# Patient Record
Sex: FEMALE | Race: ASIAN | NOT HISPANIC OR LATINO | Employment: OTHER
[De-identification: names, ages, dates, MRNs, and addresses within clinical notes are randomized per-mention and may not be internally consistent; named-entity substitution may affect disease eponyms.]

---

## 2018-06-19 ENCOUNTER — HEALTH MAINTENANCE LETTER (OUTPATIENT)
Age: 33
End: 2018-06-19

## 2018-07-25 ENCOUNTER — OFFICE VISIT (OUTPATIENT)
Dept: FAMILY MEDICINE | Facility: CLINIC | Age: 33
End: 2018-07-25

## 2018-07-25 VITALS
SYSTOLIC BLOOD PRESSURE: 106 MMHG | OXYGEN SATURATION: 98 % | BODY MASS INDEX: 23.99 KG/M2 | WEIGHT: 144 LBS | DIASTOLIC BLOOD PRESSURE: 68 MMHG | RESPIRATION RATE: 16 BRPM | HEIGHT: 65 IN | HEART RATE: 79 BPM

## 2018-07-25 DIAGNOSIS — Z12.4 SCREENING FOR MALIGNANT NEOPLASM OF CERVIX: ICD-10-CM

## 2018-07-25 DIAGNOSIS — R07.9 CHEST PAIN, UNSPECIFIED TYPE: ICD-10-CM

## 2018-07-25 DIAGNOSIS — Z00.00 ROUTINE GENERAL MEDICAL EXAMINATION AT A HEALTH CARE FACILITY: Primary | ICD-10-CM

## 2018-07-25 PROCEDURE — 99214 OFFICE O/P EST MOD 30 MIN: CPT | Mod: 25 | Performed by: NURSE PRACTITIONER

## 2018-07-25 PROCEDURE — 99385 PREV VISIT NEW AGE 18-39: CPT | Performed by: NURSE PRACTITIONER

## 2018-07-25 PROCEDURE — 93000 ELECTROCARDIOGRAM COMPLETE: CPT | Performed by: NURSE PRACTITIONER

## 2018-07-25 NOTE — LETTER
[place] [id number] [place]  [address]  Phone: [phone]    July 31, 2018      Erlinda Farooqmikhail  [address]  [facility] [address]              Dear Erlinda,    The results from your recent visit showed Pap was normal.        Sincerely,     Suha Sheppard CNP    Results for orders placed or performed in visit on 07/25/18   Pap IG HPV hr and lr ([facility])   Result Value Ref Range    DIAGNOSIS: Comment     Specimen adequacy: Comment     Clinician Provided ICD10 Comment     Performed by: Comment     QC reviewed by: Comment     . .     Note: Comment     Test Methodology: Comment     HPV High Risk Negative Negative    HPV, low-risk Negative Negative    Narrative    Performed at:  01 - [facility]  [address], [place]  [id number]  : Paula Palafox MD, Phone:  [phone]  Performed at:  02 - [facility]  [address], [place]  [id number]  : Paula Palafox MD, Phone:  [phone]  Specimen Comment: Source.............Cervix  Specimen Comment: LMP / Prev Treat...None  Specimen Comment: Dates / Results....LMP - LAST MONTH  Specimen Comment: No. of containers..01 ThinPrep Vial

## 2018-07-25 NOTE — PROGRESS NOTES
SUBJECTIVE:   CC: Erlinda Cristobal is an 33 year old woman who presents for preventive health visit. No daily meds.     Concerns of some chest pains on/off for pass 2-3 months, difficult to describe, not sharp, no sharp pains in upper body or jaw, no radiation, tender to touch, lifts 2 year old, no SOB, cold clammy sweats or perspiration or waking up at night with difficulty breathing. Pain is not effected by eating, no heartburn or reflux. Worried because has had it so long. No family history of heart issues.     Last Pap unsure    Healthy Habits:    Do you get at least three servings of calcium containing foods daily (dairy, green leafy vegetables, etc.)? yes    Amount of exercise or daily activities, outside of work: 3 day(s) per week    Problems taking medications regularly No    Medication side effects: No    Have you had an eye exam in the past two years? no    Do you see a dentist twice per year? yes    Do you have sleep apnea, excessive snoring or daytime drowsiness?no        Today's PHQ-2 Score:   PHQ-2 ( 1999 [employer]) 7/25/2018   Q1: Little interest or pleasure in doing things 0   Q2: Feeling down, depressed or hopeless 0   PHQ-2 Score 0       Abuse: Current or Past(Physical, Sexual or Emotional)- No  Do you feel safe in your environment - Yes    Social History   Substance Use Topics     Smoking status: Never Smoker     Smokeless tobacco: Never Used     Alcohol use No     If you drink alcohol do you typically have >3 drinks per day or >7 drinks per week? No                     Reviewed orders with patient.  Reviewed health maintenance and updated orders accordingly - Yes      Mammogram not appropriate for this patient based on age.    Pertinent mammograms are reviewed under the imaging tab.  History of abnormal Pap smear: NO - age 30- 65 PAP every 3 years recommended     Reviewed and updated as needed this visit by clinical staff  Tobacco  Allergies  Soc Hx        Reviewed and updated as needed this  "visit by Provider            ROS:  CONSTITUTIONAL: NEGATIVE for fever, chills, change in weight  INTEGUMENTARU/SKIN: NEGATIVE for worrisome rashes, moles or lesions  EYES: NEGATIVE for vision changes or irritation  ENT: NEGATIVE for ear, mouth and throat problems  RESP: NEGATIVE for significant cough or SOB  BREAST: NEGATIVE for masses, tenderness or discharge  CV: NEGATIVE for chest pain, palpitations or peripheral edema  GI: NEGATIVE for nausea, abdominal pain, heartburn, or change in bowel habits  : NEGATIVE for unusual urinary or vaginal symptoms. Periods are regular.  MUSCULOSKELETAL: NEGATIVE for significant arthralgias or myalgia  NEURO: NEGATIVE for weakness, dizziness or paresthesias  PSYCHIATRIC: NEGATIVE for changes in mood or affect    OBJECTIVE:   /68  Pulse 79  Resp 16  Ht 1.651 m (5' 5\")  Wt 65.3 kg (144 lb)  LMP 07/10/2018  SpO2 98%  BMI 23.96 kg/m2  EXAM:  GENERAL: healthy, alert and no distress  EYES: Eyes grossly normal to inspection, PERRL and conjunctivae and sclerae normal  HENT: ear canals and TM's normal, nose and mouth without ulcers or lesions  NECK: no adenopathy, no asymmetry, masses, or scars and thyroid normal to palpation  RESP: lungs clear to auscultation - no rales, rhonchi or wheezes  BREAST: normal without masses, tenderness or nipple discharge and no palpable axillary masses or adenopathy  CV: regular rate and rhythm, normal S1 S2, no S3 or S4, no murmur, click or rub, no peripheral edema and peripheral pulses strong  ABDOMEN: soft, nontender, no hepatosplenomegaly, no masses and bowel sounds normal  MS: no gross musculoskeletal defects noted, no edema :some tenderness to palpation over small area on left side of chest near midline. No lumps.  SKIN: no suspicious lesions or rashes  NEURO: Normal strength and tone, mentation intact and speech normal  PSYCH: mentation appears normal, affect normal/bright    Diagnostic Test Results:  EKG - negative    ASSESSMENT/PLAN: " "  1. Routine general medical examination at a health care facility  Healthy female    2. Screening for malignant neoplasm of cervix  - Pap IG HPV hr and lr ([facility])    3. Chest pain  Assume it is MS, apply warm compress TID and Ibu PRN X 14 DAYS  - EKG 12-lead complete w/read - Clinics    COUNSELING:   Reviewed preventive health counseling, as reflected in patient instructions       Regular exercise       Healthy diet/nutrition    BP Readings from Last 1 Encounters:   07/25/18 106/68     Estimated body mass index is 23.96 kg/(m^2) as calculated from the following:    Height as of this encounter: 1.651 m (5' 5\").    Weight as of this encounter: 65.3 kg (144 lb).           reports that she has never smoked. She has never used smokeless tobacco.      Counseling Resources:  ATP IV Guidelines  Pooled Cohorts Equation Calculator  Breast Cancer Risk Calculator  FRAX Risk Assessment  ICSI Preventive Guidelines  Dietary Guidelines for Americans, 2010  USDA's MyPlate  ASA Prophylaxis  Lung CA Screening    GENIE Lockhart [facility]  "

## 2018-07-25 NOTE — MR AVS SNAPSHOT
After Visit Summary   7/25/2018    Erlinda Cristobal    MRN: [id number]           Patient Information     Date Of Birth          1985        Visit Information        Provider Department      7/25/2018 9:00 AM Suha Sheppard APRN [facility]        Today's Diagnoses     Routine general medical examination at a health care facility    -  1    Screening for malignant neoplasm of cervix        Chest pain, unspecified type          Care Instructions      Preventive Health Recommendations  Female Ages 26 - 39  Yearly exam:   See your health care provider every year in order to    Review health changes.     Discuss preventive care.      Review your medicines if you your doctor has prescribed any.    Until age 30: Get a Pap test every three years (more often if you have had an abnormal result).    After age 30: Talk to your doctor about whether you should have a Pap test every 3 years or have a Pap test with HPV screening every 5 years.   You do not need a Pap test if your uterus was removed (hysterectomy) and you have not had cancer.  You should be tested each year for STDs (sexually transmitted diseases), if you're at risk.   Talk to your provider about how often to have your cholesterol checked.  If you are at risk for diabetes, you should have a diabetes test (fasting glucose).  Shots: Get a flu shot each year. Get a tetanus shot every 10 years.   Nutrition:     Eat at least 5 servings of fruits and vegetables each day.    Eat whole-grain bread, whole-wheat pasta and brown rice instead of white grains and rice.    Get adequate Calcium and Vitamin D.     Lifestyle    Exercise at least 150 minutes a week (30 minutes a day, 5 days of the week). This will help you control your weight and prevent disease.    Limit alcohol to one drink per day.    No smoking.     Wear sunscreen to prevent skin cancer.    See your dentist every six months for an exam and cleaning.            Follow-ups  "after your visit        Follow-up notes from your care team     Return if symptoms worsen or fail to improve.      Who to contact     If you have questions or need follow up information about today's clinic visit or your schedule please contact [facility] directly at [phone].  Normal or non-critical lab and imaging results will be communicated to you by [employer], letter or phone within 4 business days after the clinic has received the results. If you do not hear from us within 7 days, please contact the clinic through Sobrrhart or phone. If you have a critical or abnormal lab result, we will notify you by phone as soon as possible.  Submit refill requests through [employer] or call your pharmacy and they will forward the refill request to us. Please allow 3 business days for your refill to be completed.          Additional Information About Your Visit        Sobrrhart Information     [employer] lets you send messages to your doctor, view your test results, renew your prescriptions, schedule appointments and more. To sign up, go to [email or web address] . Click on \"Log in\" on the left side of the screen, which will take you to the Welcome page. Then click on \"Sign up Now\" on the right side of the page.     You will be asked to enter the access code listed below, as well as some personal information. Please follow the directions to create your username and password.     Your access code is: [email or web address]  Expires: 10/23/2018 12:59 PM     Your access code will  in 90 days. If you need help or a new code, please call your [facility] clinic or [phone].        Care EveryWhere ID     This is your Care EveryWhere ID. This could be used by other organizations to access your [facility] medical records  [id number]        Your Vitals Were     Pulse Respirations Height Last Period Pulse Oximetry BMI (Body Mass Index)    79 16 1.651 m (5' 5\") 07/10/2018 98% 23.96 kg/m2       Blood Pressure from Last 3 Encounters: "   18 106/68   16 119/74    Weight from Last 3 Encounters:   18 65.3 kg (144 lb)   06/15/16 64.4 kg (142 lb)              We Performed the Following     EKG 12-lead complete w/read - Clinics     Pap IG HPV hr and lr ([facility])        Primary Care Provider Office Phone # Fax #    Suha Sheppard, APRN [phone] [address]  [facility] [address]        Equal Access to Services     [facility]: Hadii aad ku hadasho Soomaali, waaxda luqadaha, qaybta kaalmada adeegyada, waxay idiin hayaan penelope perez . So [facility] [phone].    ATENCIÓN: Si habla español, tiene a brantley disposición servicios gratuitos de asistencia lingüística. Llame al [phone].    We comply with applicable federal civil rights laws and [place] laws. We do not discriminate on the basis of race, color, national origin, age, disability, sex, sexual orientation, or gender identity.            Thank you!     Thank you for choosing [facility]  for your care. Our goal is always to provide you with excellent care. Hearing back from our patients is one way we can continue to improve our services. Please take a few minutes to complete the written survey that you may receive in the mail after your visit with us. Thank you!             Your Updated Medication List - Protect others around you: Learn how to safely use, store and throw away your medicines at www.disposemymeds.org.          This list is accurate as of 18 12:59 PM.  Always use your most recent med list.                   Brand Name Dispense Instructions for use Diagnosis    ibuprofen 400 MG tablet    ADVIL/MOTRIN    120 tablet    Take 1-2 tablets (400-800 mg) by mouth every 6 hours as needed for other (cramping)     (spontaneous vaginal delivery)

## 2018-07-30 LAB
.: NORMAL
CLINICIAN PROVIDED ICD10: NORMAL
DIAGNOSIS:: NORMAL
HPV HIGH RISK: NEGATIVE
HPV, LOW RISK: NEGATIVE
Lab: NORMAL
Lab: NORMAL
PERFORMED BY:: NORMAL
SPECIMEN ADEQUACY:: NORMAL
TEST METHODOLOGY:: NORMAL

## 2019-08-28 LAB
C TRACH DNA SPEC QL PROBE+SIG AMP: NEGATIVE
HBV SURFACE AG SERPL QL IA: NEGATIVE
HIV 1+2 AB+HIV1 P24 AG SERPL QL IA: NEGATIVE
RUBELLA ABY IGG: NORMAL

## 2020-02-12 LAB — GROUP B STREP PCR: POSITIVE

## 2020-03-07 ENCOUNTER — MEDICAL CORRESPONDENCE (OUTPATIENT)
Dept: HEALTH INFORMATION MANAGEMENT | Facility: CLINIC | Age: 35
End: 2020-03-07

## 2020-03-07 ENCOUNTER — ANESTHESIA (OUTPATIENT)
Dept: OBGYN | Facility: CLINIC | Age: 35
End: 2020-03-07
Payer: COMMERCIAL

## 2020-03-07 ENCOUNTER — HOSPITAL ENCOUNTER (INPATIENT)
Facility: CLINIC | Age: 35
LOS: 1 days | Discharge: HOME HEALTH | End: 2020-03-08
Attending: OBSTETRICS & GYNECOLOGY | Admitting: OBSTETRICS & GYNECOLOGY
Payer: COMMERCIAL

## 2020-03-07 ENCOUNTER — ANESTHESIA EVENT (OUTPATIENT)
Dept: OBGYN | Facility: CLINIC | Age: 35
End: 2020-03-07
Payer: COMMERCIAL

## 2020-03-07 PROBLEM — Z36.89 ENCOUNTER FOR TRIAGE IN PREGNANT PATIENT: Status: ACTIVE | Noted: 2020-03-07

## 2020-03-07 PROBLEM — Z37.9 VACUUM-ASSISTED VAGINAL DELIVERY: Status: ACTIVE | Noted: 2020-03-07

## 2020-03-07 LAB
ABO + RH BLD: NORMAL
ABO + RH BLD: NORMAL
BLD GP AB SCN SERPL QL: NORMAL
BLOOD BANK CMNT PATIENT-IMP: NORMAL
HGB BLD-MCNC: 11.1 G/DL (ref 11.7–15.7)
RUPTURE OF FETAL MEMBRANES BY ROM PLUS: POSITIVE
SPECIMEN EXP DATE BLD: NORMAL
T PALLIDUM AB SER QL: NONREACTIVE

## 2020-03-07 PROCEDURE — 72200001 ZZH LABOR CARE VAGINAL DELIVERY SINGLE

## 2020-03-07 PROCEDURE — 25000132 ZZH RX MED GY IP 250 OP 250 PS 637: Performed by: OBSTETRICS & GYNECOLOGY

## 2020-03-07 PROCEDURE — 25000125 ZZHC RX 250: Performed by: OBSTETRICS & GYNECOLOGY

## 2020-03-07 PROCEDURE — 86901 BLOOD TYPING SEROLOGIC RH(D): CPT | Performed by: OBSTETRICS & GYNECOLOGY

## 2020-03-07 PROCEDURE — 3E033VJ INTRODUCTION OF OTHER HORMONE INTO PERIPHERAL VEIN, PERCUTANEOUS APPROACH: ICD-10-PCS | Performed by: OBSTETRICS & GYNECOLOGY

## 2020-03-07 PROCEDURE — 25000128 H RX IP 250 OP 636: Performed by: ANESTHESIOLOGY

## 2020-03-07 PROCEDURE — 84112 EVAL AMNIOTIC FLUID PROTEIN: CPT | Performed by: OBSTETRICS & GYNECOLOGY

## 2020-03-07 PROCEDURE — 85018 HEMOGLOBIN: CPT | Performed by: OBSTETRICS & GYNECOLOGY

## 2020-03-07 PROCEDURE — 86780 TREPONEMA PALLIDUM: CPT | Performed by: OBSTETRICS & GYNECOLOGY

## 2020-03-07 PROCEDURE — 40000671 ZZH STATISTIC ANESTHESIA CASE

## 2020-03-07 PROCEDURE — 12000000 ZZH R&B MED SURG/OB

## 2020-03-07 PROCEDURE — 86900 BLOOD TYPING SEROLOGIC ABO: CPT | Performed by: OBSTETRICS & GYNECOLOGY

## 2020-03-07 PROCEDURE — 36415 COLL VENOUS BLD VENIPUNCTURE: CPT | Performed by: OBSTETRICS & GYNECOLOGY

## 2020-03-07 PROCEDURE — 25000128 H RX IP 250 OP 636: Performed by: OBSTETRICS & GYNECOLOGY

## 2020-03-07 PROCEDURE — 0KQM0ZZ REPAIR PERINEUM MUSCLE, OPEN APPROACH: ICD-10-PCS | Performed by: OBSTETRICS & GYNECOLOGY

## 2020-03-07 PROCEDURE — 3E0R3BZ INTRODUCTION OF ANESTHETIC AGENT INTO SPINAL CANAL, PERCUTANEOUS APPROACH: ICD-10-PCS | Performed by: ANESTHESIOLOGY

## 2020-03-07 PROCEDURE — 86850 RBC ANTIBODY SCREEN: CPT | Performed by: OBSTETRICS & GYNECOLOGY

## 2020-03-07 PROCEDURE — 00HU33Z INSERTION OF INFUSION DEVICE INTO SPINAL CANAL, PERCUTANEOUS APPROACH: ICD-10-PCS | Performed by: ANESTHESIOLOGY

## 2020-03-07 PROCEDURE — 25800030 ZZH RX IP 258 OP 636: Performed by: OBSTETRICS & GYNECOLOGY

## 2020-03-07 PROCEDURE — 37000011 ZZH ANESTHESIA WARD SERVICE

## 2020-03-07 PROCEDURE — G0463 HOSPITAL OUTPT CLINIC VISIT: HCPCS

## 2020-03-07 RX ORDER — NALBUPHINE HYDROCHLORIDE 20 MG/ML
2.5-5 INJECTION, SOLUTION INTRAMUSCULAR; INTRAVENOUS; SUBCUTANEOUS EVERY 6 HOURS PRN
Status: DISCONTINUED | OUTPATIENT
Start: 2020-03-07 | End: 2020-03-07

## 2020-03-07 RX ORDER — ONDANSETRON 2 MG/ML
4 INJECTION INTRAMUSCULAR; INTRAVENOUS EVERY 6 HOURS PRN
Status: DISCONTINUED | OUTPATIENT
Start: 2020-03-07 | End: 2020-03-07

## 2020-03-07 RX ORDER — OXYTOCIN 10 [USP'U]/ML
10 INJECTION, SOLUTION INTRAMUSCULAR; INTRAVENOUS
Status: DISCONTINUED | OUTPATIENT
Start: 2020-03-07 | End: 2020-03-07

## 2020-03-07 RX ORDER — OXYTOCIN/0.9 % SODIUM CHLORIDE 30/500 ML
340 PLASTIC BAG, INJECTION (ML) INTRAVENOUS CONTINUOUS PRN
Status: DISCONTINUED | OUTPATIENT
Start: 2020-03-07 | End: 2020-03-08 | Stop reason: HOSPADM

## 2020-03-07 RX ORDER — NALOXONE HYDROCHLORIDE 0.4 MG/ML
.1-.4 INJECTION, SOLUTION INTRAMUSCULAR; INTRAVENOUS; SUBCUTANEOUS
Status: DISCONTINUED | OUTPATIENT
Start: 2020-03-07 | End: 2020-03-08 | Stop reason: HOSPADM

## 2020-03-07 RX ORDER — LIDOCAINE 40 MG/G
CREAM TOPICAL
Status: DISCONTINUED | OUTPATIENT
Start: 2020-03-07 | End: 2020-03-07

## 2020-03-07 RX ORDER — ACETAMINOPHEN 325 MG/1
650 TABLET ORAL EVERY 4 HOURS PRN
Status: DISCONTINUED | OUTPATIENT
Start: 2020-03-07 | End: 2020-03-07

## 2020-03-07 RX ORDER — IBUPROFEN 800 MG/1
800 TABLET, FILM COATED ORAL EVERY 6 HOURS PRN
Status: DISCONTINUED | OUTPATIENT
Start: 2020-03-07 | End: 2020-03-08 | Stop reason: HOSPADM

## 2020-03-07 RX ORDER — OXYTOCIN/0.9 % SODIUM CHLORIDE 30/500 ML
100-340 PLASTIC BAG, INJECTION (ML) INTRAVENOUS CONTINUOUS PRN
Status: COMPLETED | OUTPATIENT
Start: 2020-03-07 | End: 2020-03-07

## 2020-03-07 RX ORDER — FENTANYL CITRATE 50 UG/ML
50-100 INJECTION, SOLUTION INTRAMUSCULAR; INTRAVENOUS
Status: DISCONTINUED | OUTPATIENT
Start: 2020-03-07 | End: 2020-03-07

## 2020-03-07 RX ORDER — FENTANYL CITRATE 50 UG/ML
100 INJECTION, SOLUTION INTRAMUSCULAR; INTRAVENOUS
Status: COMPLETED | OUTPATIENT
Start: 2020-03-07 | End: 2020-03-07

## 2020-03-07 RX ORDER — OXYTOCIN 10 [USP'U]/ML
10 INJECTION, SOLUTION INTRAMUSCULAR; INTRAVENOUS
Status: DISCONTINUED | OUTPATIENT
Start: 2020-03-07 | End: 2020-03-08 | Stop reason: HOSPADM

## 2020-03-07 RX ORDER — FENTANYL CITRATE 50 UG/ML
INJECTION, SOLUTION INTRAMUSCULAR; INTRAVENOUS PRN
Status: DISCONTINUED | OUTPATIENT
Start: 2020-03-07 | End: 2020-03-07

## 2020-03-07 RX ORDER — PENICILLIN G POTASSIUM 5000000 [IU]/1
5 INJECTION, POWDER, FOR SOLUTION INTRAMUSCULAR; INTRAVENOUS ONCE
Status: COMPLETED | OUTPATIENT
Start: 2020-03-07 | End: 2020-03-07

## 2020-03-07 RX ORDER — METHYLERGONOVINE MALEATE 0.2 MG/ML
200 INJECTION INTRAVENOUS
Status: DISCONTINUED | OUTPATIENT
Start: 2020-03-07 | End: 2020-03-07

## 2020-03-07 RX ORDER — AMOXICILLIN 250 MG
2 CAPSULE ORAL 2 TIMES DAILY
Status: DISCONTINUED | OUTPATIENT
Start: 2020-03-07 | End: 2020-03-08 | Stop reason: HOSPADM

## 2020-03-07 RX ORDER — NALOXONE HYDROCHLORIDE 0.4 MG/ML
.1-.4 INJECTION, SOLUTION INTRAMUSCULAR; INTRAVENOUS; SUBCUTANEOUS
Status: DISCONTINUED | OUTPATIENT
Start: 2020-03-07 | End: 2020-03-07

## 2020-03-07 RX ORDER — SODIUM CHLORIDE, SODIUM LACTATE, POTASSIUM CHLORIDE, CALCIUM CHLORIDE 600; 310; 30; 20 MG/100ML; MG/100ML; MG/100ML; MG/100ML
INJECTION, SOLUTION INTRAVENOUS CONTINUOUS
Status: DISCONTINUED | OUTPATIENT
Start: 2020-03-07 | End: 2020-03-07

## 2020-03-07 RX ORDER — OXYCODONE AND ACETAMINOPHEN 5; 325 MG/1; MG/1
1 TABLET ORAL
Status: DISCONTINUED | OUTPATIENT
Start: 2020-03-07 | End: 2020-03-07

## 2020-03-07 RX ORDER — IBUPROFEN 800 MG/1
800 TABLET, FILM COATED ORAL
Status: DISCONTINUED | OUTPATIENT
Start: 2020-03-07 | End: 2020-03-07

## 2020-03-07 RX ORDER — OXYTOCIN/0.9 % SODIUM CHLORIDE 30/500 ML
100 PLASTIC BAG, INJECTION (ML) INTRAVENOUS CONTINUOUS
Status: DISCONTINUED | OUTPATIENT
Start: 2020-03-07 | End: 2020-03-08 | Stop reason: HOSPADM

## 2020-03-07 RX ORDER — EPHEDRINE SULFATE 50 MG/ML
5 INJECTION, SOLUTION INTRAMUSCULAR; INTRAVENOUS; SUBCUTANEOUS
Status: DISCONTINUED | OUTPATIENT
Start: 2020-03-07 | End: 2020-03-07

## 2020-03-07 RX ORDER — LANOLIN 100 %
OINTMENT (GRAM) TOPICAL
Status: DISCONTINUED | OUTPATIENT
Start: 2020-03-07 | End: 2020-03-08 | Stop reason: HOSPADM

## 2020-03-07 RX ORDER — OXYTOCIN/0.9 % SODIUM CHLORIDE 30/500 ML
1-24 PLASTIC BAG, INJECTION (ML) INTRAVENOUS CONTINUOUS
Status: DISCONTINUED | OUTPATIENT
Start: 2020-03-07 | End: 2020-03-07

## 2020-03-07 RX ORDER — ONDANSETRON 4 MG/1
4 TABLET, ORALLY DISINTEGRATING ORAL EVERY 6 HOURS PRN
Status: DISCONTINUED | OUTPATIENT
Start: 2020-03-07 | End: 2020-03-07

## 2020-03-07 RX ORDER — PRENATAL VIT/IRON FUM/FOLIC AC 27MG-0.8MG
1 TABLET ORAL DAILY
COMMUNITY

## 2020-03-07 RX ORDER — BISACODYL 10 MG
10 SUPPOSITORY, RECTAL RECTAL DAILY PRN
Status: DISCONTINUED | OUTPATIENT
Start: 2020-03-09 | End: 2020-03-08 | Stop reason: HOSPADM

## 2020-03-07 RX ORDER — AMOXICILLIN 250 MG
1 CAPSULE ORAL 2 TIMES DAILY
Status: DISCONTINUED | OUTPATIENT
Start: 2020-03-07 | End: 2020-03-08 | Stop reason: HOSPADM

## 2020-03-07 RX ORDER — HYDROCORTISONE 2.5 %
CREAM (GRAM) TOPICAL 3 TIMES DAILY PRN
Status: DISCONTINUED | OUTPATIENT
Start: 2020-03-07 | End: 2020-03-08 | Stop reason: HOSPADM

## 2020-03-07 RX ORDER — ACETAMINOPHEN 325 MG/1
650 TABLET ORAL EVERY 4 HOURS PRN
Status: DISCONTINUED | OUTPATIENT
Start: 2020-03-07 | End: 2020-03-08 | Stop reason: HOSPADM

## 2020-03-07 RX ORDER — CARBOPROST TROMETHAMINE 250 UG/ML
250 INJECTION, SOLUTION INTRAMUSCULAR
Status: DISCONTINUED | OUTPATIENT
Start: 2020-03-07 | End: 2020-03-07

## 2020-03-07 RX ADMIN — FENTANYL CITRATE 100 MCG: 50 INJECTION, SOLUTION INTRAMUSCULAR; INTRAVENOUS at 10:16

## 2020-03-07 RX ADMIN — SODIUM CHLORIDE 2.5 MILLION UNITS: 9 INJECTION, SOLUTION INTRAVENOUS at 12:52

## 2020-03-07 RX ADMIN — SODIUM CHLORIDE, POTASSIUM CHLORIDE, SODIUM LACTATE AND CALCIUM CHLORIDE: 600; 310; 30; 20 INJECTION, SOLUTION INTRAVENOUS at 08:44

## 2020-03-07 RX ADMIN — Medication 2 MILLI-UNITS/MIN: at 12:48

## 2020-03-07 RX ADMIN — Medication 10 ML/HR: at 10:20

## 2020-03-07 RX ADMIN — IBUPROFEN 800 MG: 800 TABLET ORAL at 15:49

## 2020-03-07 RX ADMIN — PENICILLIN G POTASSIUM 5 MILLION UNITS: 5000000 POWDER, FOR SOLUTION INTRAMUSCULAR; INTRAPLEURAL; INTRATHECAL; INTRAVENOUS at 08:44

## 2020-03-07 RX ADMIN — Medication 2 MILLI-UNITS/MIN: at 12:13

## 2020-03-07 RX ADMIN — SENNOSIDES AND DOCUSATE SODIUM 1 TABLET: 8.6; 5 TABLET ORAL at 22:02

## 2020-03-07 RX ADMIN — SODIUM CHLORIDE, POTASSIUM CHLORIDE, SODIUM LACTATE AND CALCIUM CHLORIDE: 600; 310; 30; 20 INJECTION, SOLUTION INTRAVENOUS at 11:29

## 2020-03-07 RX ADMIN — IBUPROFEN 800 MG: 800 TABLET ORAL at 22:03

## 2020-03-07 RX ADMIN — Medication 340 ML/HR: at 13:56

## 2020-03-07 NOTE — PROVIDER NOTIFICATION
03/07/20 1225   Provider Notification   Provider Name/Title Dr Chun   Method of Notification Phone   Request Evaluate in Person     MD called and updated on FHR tracing with les, RN requested to come to bedside

## 2020-03-07 NOTE — ANESTHESIA PROCEDURE NOTES
Peripheral nerve/Neuraxial procedure note : epidural catheter  Pre-Procedure  Performed by Yunior Mclain MD  Location: floor, OB    Procedure Times:3/7/2020 10:05 AM and 3/7/2020 10:24 AM  Pre-Anesthestic Checklist: patient identified, IV checked, risks and benefits discussed, informed consent, monitors and equipment checked, pre-op evaluation and at physician/surgeon's request    Timeout  Correct Patient: Yes   Correct Procedure: Yes   Correct Site: Yes   Correct Laterality: N/A   Correct Position: Yes   Site Marked: No   .   Procedure Documentation    .    Procedure: epidural catheter, .   Patient Position:sitting Insertion Site:L3-4  (midline approach) Injection technique: LORT saline   Local skin infiltrated with 5 mL of 1% lidocaine.  GORDY at 5 cm    Patient Prep/Sterile Barriers; mask, sterile gloves, povidone-iodine 7.5% surgical scrub, patient draped.  .  Needle: Touhy needle   Needle Gauge: 17.    Needle Length (Inches) 3.5   # of attempts: 2 and # of redirects:  .    Catheter: 19 G . .  Catheter threaded easily  6 cm epidural space.  11 cm at skin.   .    Assessment/Narrative  Paresthesias: No.  .  .  . Test dose of 5 mL lidocaine 1.5% w/ 1:200,000 epinephrine at 10:16.  Test dose negative for signs of intravascular, subdural or intrathecal injection. Comments:  I or my partner am immediately available. I or my partner will monitor the patient and supervise nursing care at necessary intervals.    Given 10 ml infusion  (0.125% bupivicaine/fentanyl 2 mcg/ml ) with 100 mcg fentanyl

## 2020-03-07 NOTE — PROVIDER NOTIFICATION
03/07/20 1049   Provider Notification   Provider Name/Title dr Chun   Method of Notification At Bedside   Request Evaluate in Person     MD at bedside for SVE. Orders to recheck pt and reassess contraction pattern in one hour. Call MD with update.

## 2020-03-07 NOTE — PLAN OF CARE
Data: Patient presented to [facility]: 3/7/2020  7:47 AM.  Reason for maternal/fetal assessment is uterine contractions. Patient reports that early this morning she started feeling strong contractions every 5-10 minutes and she has noticed a little bit of fluid leaking too.  Patient is a .  Prenatal record reviewed. Pregnancy has been uncomplicated..  Gestational Age 39w3d. VSS. Fetal movement present. Patient denies vaginal bleeding, nausea, vomiting, headache, visual disturbances, epigastric or URQ pain, significant edema. Support person is present.   Action: Verbal consent for EFM. Triage assessment completed. Bill of rights reviewed.  Response: Patient verbalized agreement with plan. Will contact Dr Anais Chun with update and further orders.

## 2020-03-07 NOTE — PROGRESS NOTES
OB Progress Note  3/7/2020  12:35 PM    S:  In to see patient after 3 late decelerations (immediately after starting Pitocin - was category I prior to that). Comfortable with epidural. Not feeling any pressure      O:  /53   Temp 97.9  F (36.6  C) (Oral)   Resp 16   EFM: baseline 130, accelerations present, late decelerations with contractions for past 4 contractions, moderate variability; Category II  [place]:  Ctx q2-5 min  SVE:  7/90%/-2  Membranes: ruptured    Pitocin started at 2 for 10 minutes, now off    A/P:  34 year old  @39w3d admitted with SROM/labor    1.  Routine cares  2.  Labor: Pitocin off, will monitor for now, O2 and position change. Cervix still very stretchy, head high  3.  Anticipate     Anais Chun MD

## 2020-03-07 NOTE — H&P
OB Brief Admit H&P    No significant change in general health status based on examination of the patient, review of Nursing Admission Database and prenatal record.    Pt is a 34 year old  @ 39w3d who presented to L&D with contractions every 5-10min and small amount of leakage of fluid. Was closed in the office yesterday..    Patient's prenatal course has been uncomplicated. Low lying placenta and fetal pyelectasis both resolved at 28 week ultrasound.    Prenatal Labs:    Blood type O+  Rubella immune    GBS positive    EFW: 7lb    /76   Temp 97.9  F (36.6  C) (Oral)   Resp 16   EFM:  Baseline 130, moderate variability, + accels, occasional early decels, Cat I  [place]: Every 2-5min  SVE: 4/60%/-2  Membranes:  ROM plus positive    Assessment:  34 year old  @ 39w3d admitted for early labor/SROM    Plan:  1. Admit to labor and delivery   2. Labor: Expectant mgmt at this time. OK for epidural if desired.   3. GBS positive: Will start PCN now  4. Anticipate     Anais Chun MD  3/7/2020  9:01 AM

## 2020-03-07 NOTE — PROVIDER NOTIFICATION
03/07/20 1248   Provider Notification   Provider Name/Title Dr Chun   Method of Notification In Department   MD in department and reviewing FHR tracing. With moderate variability orders to restart pitcoin, MD states she is on floor and will remain in department.

## 2020-03-07 NOTE — PLAN OF CARE
Pt transferred from L & D to  445 at 1700.  Pt settled and oriented to room and clipboard.  Pt's pain being managed with Ibuprofen.   at bedside.  Bonding well with baby.     Patients mobililty level scored using the bedside mobility assistance tool (BMAT). Patient is at a mobility level test number: 3. Mobility equipment used: wheelchair. Required assist of 2 staff members. Further use of BMAT scoring required.      Update:  Pt up to BR and voided without difficulty.  Family at bedside and supportive.    Patients mobililty level scored using the bedside mobility assistance tool (BMAT). Patient is at a mobility level test number: 4. Mobility equipment used: none required. Required assist of 0 staff members. Further use of BMAT scoring not required.

## 2020-03-07 NOTE — ANESTHESIA PREPROCEDURE EVALUATION
"Anesthesia Pre-Procedure Evaluation    Patient: Erlinda Cristobal   MRN: [id number] : 1985          Preoperative Diagnosis: * No surgery found *        Past Medical History:   Diagnosis Date     Anemia 2016    On iron     History reviewed. No pertinent surgical history.  Anesthesia Evaluation       history and physical reviewed .             ROS/MED HX    ENT/Pulmonary:  - neg pulmonary ROS     Neurologic:  - neg neurologic ROS     Cardiovascular:  - neg cardiovascular ROS       METS/Exercise Tolerance:     Hematologic:         Musculoskeletal:         GI/Hepatic:  - neg GI/hepatic ROS       Renal/Genitourinary:         Endo:         Psychiatric:         Infectious Disease:         Malignancy:         Other:                     neg OB ROS            Physical Exam      Airway     Dental     Cardiovascular       Pulmonary     Other findings:  at term, in labor, requesting pain  management        Lab Results   Component Value Date    HGB 11.1 (L) 2020       Preop Vitals  BP Readings from Last 3 Encounters:   20 121/76   18 106/68   16 119/74    Pulse Readings from Last 3 Encounters:   18 79   16 70      Resp Readings from Last 3 Encounters:   20 16   18 16   16 16    SpO2 Readings from Last 3 Encounters:   18 98%      Temp Readings from Last 1 Encounters:   20 98.1  F (36.7  C) (Oral)    Ht Readings from Last 1 Encounters:   18 1.651 m (5' 5\")      Wt Readings from Last 1 Encounters:   18 65.3 kg (144 lb)    Estimated body mass index is 23.96 kg/m  as calculated from the following:    Height as of 18: 1.651 m (5' 5\").    Weight as of 18: 65.3 kg (144 lb).       Anesthesia Plan      History & Physical Review      ASA Status:  2 .  OB Epidural Asa: 2       Plan for Epidural     Discussed risks of epidural catheter placement, including, but not limited to, bruising/bleeding, infection, pain, failure of epidural medications " to relieve pain, dural puncture with subsequent headache/ need for epidural blood patch and nerve damage.  All questions answered, understanding voiced and she wishes to proceed.      Postoperative Care      Consents  Anesthetic plan, risks, benefits and alternatives discussed with:  Patient..                 Yunior Mclain MD                    .

## 2020-03-07 NOTE — PROVIDER NOTIFICATION
03/07/20 1159   Provider Notification   Provider Name/Title Dr Chun   Method of Notification In Department   Request Evaluate - Remote     MD in department reviewed strip, orders to start pitocin.

## 2020-03-07 NOTE — L&D DELIVERY NOTE
OB Delivery Summary      HISTORY OF PRESENT ILLNESS:   with  IUP @ 39w3d who presented with regular painful contractions.  Her prenatal course was  uncomplicated.  Prenatal labs were significant for blood type O+, rubella status immune.  Glucose challenge test 118.  Group beta strep culture was positive.  Estimated fetal weight on admission was approximately 7lb.        FIRST STAGE OF LABOR:  The patient was admitted to Labor and Delivery with a fetal heart rate tracing that was category I. Cervical exam on admission was 4/60/-2 and ROM plus test was positive. She was given Penicillin for GBS prophylaxis. Pitocin was started for augmentation after about 3 hours. She received an epidural for pain control at her request and gradually progressed in labor. She became completely dilated at 1:30pm.      SECOND STAGE OF LABOR:  The patient began pushing at 1:41pm from the +1 station.  She gradually brought the vertex down in the birth canal until the head was +3 to +4 station. Deep variable decelerations were noted with contractions with pushing, and at the + 4 station there was a prolonged fetal bradycardia to the 70's for almost 10 minutes. The patient had a dense epidural and had some difficulty pushing. Vacuum assisted vaginal delivery was recommended at this time.     Verbal informed consent was obtained.  Alternatives to vacuum discussed.  Risks of vacuum delivery discussed including risk of cephalohematoma, subgaleal hemorrhage, epidural hemorrhage, intercranial hemorrhage and maternal perineal laceration. Patient gave verbal consent to proceed.  NICU and OR alerted that vacuum extraction planned.  Patient's bladder had recently been drained.  Kiwi vacuum was applied at the +4 station for a total of 10 seconds over 1 contraction, with no pop-offs. Vacuum removed after fetal vertex brought to a full crown.  Remainder of infant delivered without complication. A single nuchal cord was reduced prior to delivery  of the body.   A viable male infant at 1:51pm on 3/7/2020. The infant was placed on the mother's chest.  Delayed cord clamping was performed.        THIRD STAGE OF LABOR:  The cord was clamped and cut. The placenta then delivered spontaneously and appeared intact with a 3-vessel cord at 1:56pm.  Pitocin was started and fundal massage was performed.  Perineum was inspected and a small second degree laceration was noted. It was repaired with a running suture of 3-0 Vicryl. Quantitative blood loss for the delivery was 275cc.       Both mother and baby tolerated delivery well and there were no complications. Fetal weight was 8lb1oz and Apgars were 8 and 9 at 1 and 5 minutes, respectively.       Anais Chun MD  3/7/2020  2:07 PM

## 2020-03-07 NOTE — PROVIDER NOTIFICATION
03/07/20 0817   Provider Notification   Provider Name/Title Dr Chun   Method of Notification Phone   Request Evaluate - Remote   Notification Reason SVE     MD updated on pt arrival. Orders to start antibiotics ASAP and admit pt. MD will be to hospital in about 15minute

## 2020-03-08 VITALS
HEART RATE: 85 BPM | TEMPERATURE: 97.8 F | RESPIRATION RATE: 18 BRPM | SYSTOLIC BLOOD PRESSURE: 119 MMHG | DIASTOLIC BLOOD PRESSURE: 76 MMHG

## 2020-03-08 LAB — HGB BLD-MCNC: 9.9 G/DL (ref 11.7–15.7)

## 2020-03-08 PROCEDURE — 85018 HEMOGLOBIN: CPT | Performed by: OBSTETRICS & GYNECOLOGY

## 2020-03-08 PROCEDURE — 25000132 ZZH RX MED GY IP 250 OP 250 PS 637: Performed by: OBSTETRICS & GYNECOLOGY

## 2020-03-08 PROCEDURE — 36415 COLL VENOUS BLD VENIPUNCTURE: CPT | Performed by: OBSTETRICS & GYNECOLOGY

## 2020-03-08 RX ADMIN — IBUPROFEN 800 MG: 800 TABLET ORAL at 05:15

## 2020-03-08 NOTE — PLAN OF CARE
Data: Vital signs within normal limits. Postpartum checks within normal limits - see flow record. Patient eating and drinking normally. Patient able to empty bladder independently and is up ambulating. No apparent signs of infection. Patient performing self cares and is able to care for infant.  Action: Patient declined pain interventions, patient stated she was comfortable. Patient education completed. See flow record.  Response: Positive attachment behaviors observed with infant. Support persons father of infant present and attentive to both mother and infant needs.   Plan: Anticipate discharge on evening shift.

## 2020-03-08 NOTE — PROGRESS NOTES
March 8, 2020  Postpartum Progress Note:       DAILY NOTE - POSTPARTUM DAY 1    SUBJECTIVE: feels well and requests discharge today     Pain controlled? Yes  Tolerating a regular diet? YES  Ambulating? YES  Voiding without difficulty? Yes  Lochia? minimal  Breastfeeding:  yes  Desired contraception? none    OBJECTIVE:  Vitals:    03/07/20 1553 03/07/20 2109 03/08/20 0100 03/08/20 0834   BP: 99/56 115/70 107/65 119/76   Pulse:    85   Resp:  16 20 18   Temp:  98.2  F (36.8  C) 97.8  F (36.6  C) 97.8  F (36.6  C)   TempSrc:  Oral Oral Oral       Constitutional: healthy, alert and no distress    Abdomen:  Uterine fundus is firm, non-tender and at the level of the umbilicus      Extremeties:  tr edema, non-tender    LABS:  Hemoglobin   Date Value Ref Range Status   03/08/2020 9.9 (L) 11.7 - 15.7 g/dL Final   03/07/2020 11.1 (L) 11.7 - 15.7 g/dL Final     Lab Results   Component Value Date    RUBELLAABIGG immune 08/28/2019      Lab Results   Component Value Date    ABO O 03/07/2020           Lab Results   Component Value Date    RH Pos 03/07/2020        ASSESSMENT:  Post-partum day #1  Normal spontaneous vaginal delivery  Doing well.     PLAN:   Discharge later today    Miryam Lea MD

## 2020-03-08 NOTE — PLAN OF CARE
VSS, voiding freely without any problems. Ambulating independently and independent with self and  cares. Pain being managed by ibuprofen. Breastfeeding well. No complaints of headache, dizziness, changes in vision, chest pain or difficulty of breathing noted. Saline lock in place, flushed, patent.

## 2020-03-08 NOTE — PLAN OF CARE
Dr. Lea in to see patient this AM, discharge order placed. Home care order faxed for early discharge, phone number given to patient. All education completed, no further questions at this time. Discharged to home ambulatory at 1530.

## 2020-03-08 NOTE — ANESTHESIA POSTPROCEDURE EVALUATION
Patient: Erlinda Cristobal    * No procedures listed *    Diagnosis:* No pre-op diagnosis entered *  Diagnosis Additional Information: IUP, in labor    Anesthesia Type:  Epidural    Note:  Anesthesia Post Evaluation         Comments:     S/P epidural for labor.   I or my partner was immediately available for management of this patient during epidural analgesia infusion.  VSS.  Doing well. Block resolved.  Neuro at baseline. Denies positional headache. Minimal side effects easily managed w/ PRN meds. No apparent anesthetic complications. No follow-up required.    DEVIN Hood MD          Last vitals:  Vitals:    03/07/20 2109 03/08/20 0100 03/08/20 0834   BP: 115/70 107/65 119/76   Pulse:   85   Resp: 16 20 18   Temp: 98.2  F (36.8  C) 97.8  F (36.6  C) 97.8  F (36.6  C)         Electronically Signed By: Chris Hood MD  March 8, 2020  10:15 AM

## 2021-01-15 ENCOUNTER — HEALTH MAINTENANCE LETTER (OUTPATIENT)
Age: 36
End: 2021-01-15

## 2021-10-09 ENCOUNTER — HEALTH MAINTENANCE LETTER (OUTPATIENT)
Age: 36
End: 2021-10-09

## 2022-01-29 ENCOUNTER — HEALTH MAINTENANCE LETTER (OUTPATIENT)
Age: 37
End: 2022-01-29

## 2022-09-11 ENCOUNTER — HEALTH MAINTENANCE LETTER (OUTPATIENT)
Age: 37
End: 2022-09-11

## 2023-05-06 ENCOUNTER — HEALTH MAINTENANCE LETTER (OUTPATIENT)
Age: 38
End: 2023-05-06